# Patient Record
Sex: FEMALE | NOT HISPANIC OR LATINO | Employment: PART TIME | URBAN - METROPOLITAN AREA
[De-identification: names, ages, dates, MRNs, and addresses within clinical notes are randomized per-mention and may not be internally consistent; named-entity substitution may affect disease eponyms.]

---

## 2022-07-09 ENCOUNTER — HOSPITAL ENCOUNTER (EMERGENCY)
Facility: CLINIC | Age: 27
Discharge: HOME | End: 2022-07-09
Attending: EMERGENCY MEDICINE | Admitting: EMERGENCY MEDICINE

## 2022-07-09 ENCOUNTER — APPOINTMENT (OUTPATIENT)
Dept: ULTRASOUND IMAGING | Facility: CLINIC | Age: 27
End: 2022-07-09
Attending: EMERGENCY MEDICINE

## 2022-07-09 VITALS
DIASTOLIC BLOOD PRESSURE: 96 MMHG | OXYGEN SATURATION: 99 % | SYSTOLIC BLOOD PRESSURE: 144 MMHG | TEMPERATURE: 98.1 F | RESPIRATION RATE: 18 BRPM | HEART RATE: 100 BPM

## 2022-07-09 DIAGNOSIS — O20.0 THREATENED MISCARRIAGE IN EARLY PREGNANCY: ICD-10-CM

## 2022-07-09 LAB
ABO/RH(D): NORMAL
ALBUMIN UR-MCNC: NEGATIVE MG/DL
AMORPH CRY #/AREA URNS HPF: ABNORMAL /HPF
ANION GAP SERPL CALCULATED.3IONS-SCNC: 11 MMOL/L (ref 5–18)
ANTIBODY SCREEN: NEGATIVE
APPEARANCE UR: ABNORMAL
BACTERIA #/AREA URNS HPF: ABNORMAL /HPF
BASOPHILS # BLD AUTO: 0 10E3/UL (ref 0–0.2)
BASOPHILS NFR BLD AUTO: 0 %
BILIRUB UR QL STRIP: NEGATIVE
BUN SERPL-MCNC: 10 MG/DL (ref 8–22)
CALCIUM SERPL-MCNC: 9.7 MG/DL (ref 8.5–10.5)
CHLORIDE BLD-SCNC: 105 MMOL/L (ref 98–107)
CO2 SERPL-SCNC: 24 MMOL/L (ref 22–31)
COLOR UR AUTO: ABNORMAL
CREAT SERPL-MCNC: 0.63 MG/DL (ref 0.6–1.1)
EOSINOPHIL # BLD AUTO: 0.2 10E3/UL (ref 0–0.7)
EOSINOPHIL NFR BLD AUTO: 1 %
ERYTHROCYTE [DISTWIDTH] IN BLOOD BY AUTOMATED COUNT: 12.3 % (ref 10–15)
GFR SERPL CREATININE-BSD FRML MDRD: >90 ML/MIN/1.73M2
GLUCOSE BLD-MCNC: 87 MG/DL (ref 70–125)
GLUCOSE UR STRIP-MCNC: NEGATIVE MG/DL
HCG SERPL-ACNC: 109 MLU/ML (ref 0–4)
HCT VFR BLD AUTO: 43.5 % (ref 35–47)
HGB BLD-MCNC: 14.4 G/DL (ref 11.7–15.7)
HGB UR QL STRIP: ABNORMAL
HOLD SPECIMEN: NORMAL
IMM GRANULOCYTES # BLD: 0 10E3/UL
IMM GRANULOCYTES NFR BLD: 0 %
KETONES UR STRIP-MCNC: NEGATIVE MG/DL
LEUKOCYTE ESTERASE UR QL STRIP: ABNORMAL
LYMPHOCYTES # BLD AUTO: 3.9 10E3/UL (ref 0.8–5.3)
LYMPHOCYTES NFR BLD AUTO: 33 %
MCH RBC QN AUTO: 29.9 PG (ref 26.5–33)
MCHC RBC AUTO-ENTMCNC: 33.1 G/DL (ref 31.5–36.5)
MCV RBC AUTO: 90 FL (ref 78–100)
MONOCYTES # BLD AUTO: 0.9 10E3/UL (ref 0–1.3)
MONOCYTES NFR BLD AUTO: 8 %
MUCOUS THREADS #/AREA URNS LPF: PRESENT /LPF
NEUTROPHILS # BLD AUTO: 7 10E3/UL (ref 1.6–8.3)
NEUTROPHILS NFR BLD AUTO: 58 %
NITRATE UR QL: NEGATIVE
NRBC # BLD AUTO: 0 10E3/UL
NRBC BLD AUTO-RTO: 0 /100
PH UR STRIP: 7 [PH] (ref 5–7)
PLATELET # BLD AUTO: 294 10E3/UL (ref 150–450)
POTASSIUM BLD-SCNC: 4.2 MMOL/L (ref 3.5–5)
RBC # BLD AUTO: 4.81 10E6/UL (ref 3.8–5.2)
RBC URINE: 1 /HPF
SODIUM SERPL-SCNC: 140 MMOL/L (ref 136–145)
SP GR UR STRIP: 1.02 (ref 1–1.03)
SPECIMEN EXPIRATION DATE: NORMAL
SQUAMOUS EPITHELIAL: 6 /HPF
UROBILINOGEN UR STRIP-MCNC: <2 MG/DL
WBC # BLD AUTO: 12.1 10E3/UL (ref 4–11)
WBC URINE: 8 /HPF

## 2022-07-09 PROCEDURE — 36415 COLL VENOUS BLD VENIPUNCTURE: CPT | Performed by: EMERGENCY MEDICINE

## 2022-07-09 PROCEDURE — 85025 COMPLETE CBC W/AUTO DIFF WBC: CPT | Performed by: EMERGENCY MEDICINE

## 2022-07-09 PROCEDURE — 76801 OB US < 14 WKS SINGLE FETUS: CPT

## 2022-07-09 PROCEDURE — 81001 URINALYSIS AUTO W/SCOPE: CPT | Performed by: EMERGENCY MEDICINE

## 2022-07-09 PROCEDURE — 80048 BASIC METABOLIC PNL TOTAL CA: CPT | Performed by: EMERGENCY MEDICINE

## 2022-07-09 PROCEDURE — 87086 URINE CULTURE/COLONY COUNT: CPT | Performed by: EMERGENCY MEDICINE

## 2022-07-09 PROCEDURE — 99284 EMERGENCY DEPT VISIT MOD MDM: CPT | Mod: 25

## 2022-07-09 PROCEDURE — 86850 RBC ANTIBODY SCREEN: CPT | Performed by: EMERGENCY MEDICINE

## 2022-07-09 PROCEDURE — 84702 CHORIONIC GONADOTROPIN TEST: CPT | Performed by: EMERGENCY MEDICINE

## 2022-07-09 ASSESSMENT — ENCOUNTER SYMPTOMS
ABDOMINAL PAIN: 1
FEVER: 0

## 2022-07-09 NOTE — ED PROVIDER NOTES
EMERGENCY DEPARTMENT ENCOUNTER      NAME: Ana Luisa Diaz  AGE: 26 year old female  YOB: 1995  MRN: [id number]  EVALUATION DATE & TIME: 7/9/2022  6:34 PM    PCP: No primary care provider on file.    ED PROVIDER: José Miguel Rodriguez M.D.      Chief Complaint   Patient presents with     Vaginal Bleeding - Pregnant         FINAL IMPRESSION:  1. Threatened miscarriage in early pregnancy          ED COURSE & MEDICAL DECISION MAKING:    Pertinent Labs & Imaging studies reviewed. (See chart for details)  ED Course as of 07/09/22 2243   Sat Jul 09, 2022   1848 Patient is a 26-year-old who presents with vaginal spotting, some cramping yesterday but pain-free today.  She is 4-5 weeks pregnant, but has not established OB care yet.  She is mildly tachycardic, not bleeding significantly, but plan to rule out miscarriage with ultrasound, obtain initial dose of blood work here including Rh, UA   2029 US OB <14 Weeks W Transvaginal  1.  No intrauterine findings of pregnancy.     2.  Follow-up beta hCG and ultrasound may be useful if clinically indicated.     3.  Tiny follicles in both ovaries with minimal free fluid adjacent to the right ovary most typical for a recently ruptured cyst.   2154 WBC(!): 12.1   2221 hCG Quantitative(!): 109     After the patient using a Cambodian .  We discussed the differential that included very early pregnancy without visible gestational sac versus miscarriage.  She will follow-up with OB this coming week.  She was given the phone number to do this.  We also discussed importance of coming back to emergency department if she develops abdominal pain pelvic pain given possibility of ectopic.    Additional ED Course Timestamps:  6:50 PM I met with the patient, obtained history, performed an initial exam, and discussed options and plan for diagnostics and treatment here in the ED.  7:11 PM Reevaluated patient.       At the conclusion of the encounter I discussed the results of all of  the tests and the disposition. The questions were answered. The patient or family acknowledged understanding and was agreeable with the care plan.       MEDICATIONS GIVEN IN THE EMERGENCY:  Medications - No data to display      NEW PRESCRIPTIONS STARTED AT TODAY'S ER VISIT  New Prescriptions    No medications on file          =================================================================    HPI    Patient information was obtained from: Patient    Use of :  - Cambodian.         Ana Luisa Diaz is a 26 year old female with a noncontributory past medical history who presents to this ED for evaluation of vaginal bleeding.     Patient developed vaginal bleeding yesterday, 7/8. She was using the bathroom when she wiped and noticed blood on the tissue. Patient also endorsed localized lower abdominal cramping at this time. She states that she had some mild bleeding again today but the abdominal cramping has resolved. Currently, she denies in any aches or pains.     Patient's last menstrual period was in Mid April and she recently at a positive home pregnancy test. She approximates being 4-5 weeks pregnant. She has yet to see an OB provider. She currently takes prenatal vitamins. Patient denies vaginal discharge, fevers, or any other complaints at this time.          REVIEW OF SYSTEMS   Review of Systems   Constitutional: Negative for fever.   Gastrointestinal: Positive for abdominal pain (lower, cramping; since resolved).   Genitourinary: Positive for vaginal bleeding. Negative for vaginal discharge.   All other systems reviewed and are negative.     PAST MEDICAL HISTORY:  History reviewed. No pertinent past medical history.    PAST SURGICAL HISTORY:  History reviewed. No pertinent surgical history.        CURRENT MEDICATIONS:    No current facility-administered medications for this encounter.     No current outpatient medications on file.       ALLERGIES:  No Known Allergies    FAMILY HISTORY:  History  reviewed. No pertinent family history.    SOCIAL HISTORY:        VITALS:  BP (!) 144/96   Pulse 100   Temp 98.1  F (36.7  C) (Temporal)   Resp 18   SpO2 99%     PHYSICAL EXAM    Constitutional: Well developed, well nourished. Comfortable appearing.  HENT: Normocephalic, atraumatic, mucous membranes moist, nose normal. Neck- Supple, gross ROM intact.   Eyes: Pupils mid-range, conjunctiva without injection, no discharge.   Respiratory: Clear to auscultation bilaterally, no respiratory distress, no wheezing, speaks full sentences easily. No cough.  Cardiovascular: Regular rhythm, no murmurs. Tachycardic.   GI: Soft, no tenderness to deep palpation in all quadrants, no masses.   Musculoskeletal: Moving all 4 extremities intentionally and without pain. No obvious deformity.  Skin: Warm, dry, no rash.  Neurologic: Alert & oriented x 3, cranial nerves grossly intact.  Psychiatric: Affect normal, cooperative.       LAB:  All pertinent labs reviewed and interpreted.  Labs Ordered and Resulted from Time of ED Arrival to Time of ED Departure   HCG QUANTITATIVE PREGNANCY - Abnormal       Result Value    hCG Quantitative 109 (*)    CBC WITH PLATELETS AND DIFFERENTIAL - Abnormal    WBC Count 12.1 (*)     RBC Count 4.81      Hemoglobin 14.4      Hematocrit 43.5      MCV 90      MCH 29.9      MCHC 33.1      RDW 12.3      Platelet Count 294      % Neutrophils 58      % Lymphocytes 33      % Monocytes 8      % Eosinophils 1      % Basophils 0      % Immature Granulocytes 0      NRBCs per 100 WBC 0      Absolute Neutrophils 7.0      Absolute Lymphocytes 3.9      Absolute Monocytes 0.9      Absolute Eosinophils 0.2      Absolute Basophils 0.0      Absolute Immature Granulocytes 0.0      Absolute NRBCs 0.0     BASIC METABOLIC PANEL - Normal    Sodium 140      Potassium 4.2      Chloride 105      Carbon Dioxide (CO2) 24      Anion Gap 11      Urea Nitrogen 10      Creatinine 0.63      Calcium 9.7      Glucose 87      GFR Estimate >90      ROUTINE UA WITH MICROSCOPIC REFLEX TO CULTURE   TYPE AND SCREEN, ADULT   ABO/RH TYPE AND SCREEN       RADIOLOGY:  Reviewed all pertinent imaging. Please see official radiology report.  US OB <14 Weeks W Transvaginal   Final Result   IMPRESSION:    1.  No intrauterine findings of pregnancy.      2.  Follow-up beta hCG and ultrasound may be useful if clinically indicated.      3.  Tiny follicles in both ovaries with minimal free fluid adjacent to the right ovary most typical for a recently ruptured cyst.                EKG:    All EKG interpretations will be found in ED course above.    PROCEDURES:   None.       I, Renetta Smith am serving as a scribe to document services personally performed by Dr. José Miguel Rodriguez based on my observation and the provider's statements to me. I, José Miguel Rodriguez MD attest that Renetta Smith is acting in a scribe capacity, has observed my performance of the services and has documented them in accordance with my direction.    José Miguel Rodriguez M.D.  Emergency Medicine  [facility] EMERGENCY ROOM  [address] [facility] [id number] [phone]  Dept: [phone]     José Miguel Rodriguez MD  07/09/22 8833

## 2022-07-09 NOTE — ED TRIAGE NOTES
Pt reports she is 5 weeks pregnant. Reports last night she began to have lower abdominal pain and noted she was bleeding with urination. Denies pain at present.  services used for triage.     Triage Assessment     Row Name 07/09/22 5090       Triage Assessment (Adult)    Airway WDL WDL       Respiratory WDL    Respiratory WDL WDL       Skin Circulation/Temperature WDL    Skin Circulation/Temperature WDL WDL       Cardiac WDL    Cardiac WDL WDL       Peripheral/Neurovascular WDL    Peripheral Neurovascular WDL WDL       Cognitive/Neuro/Behavioral WDL    Cognitive/Neuro/Behavioral WDL WDL

## 2022-07-10 NOTE — ED NOTES
RN attempt x2 for IV. Per MD, okay for labs draw only. Ultrasound technician at bedside. MD stated it's okay for patient to receive ultrasound first before lab draws.

## 2022-07-10 NOTE — DISCHARGE INSTRUCTIONS
Your pregnancy test here was positive.  The ultrasound did not show any fetus, however this may be because it is so early on in the pregnancy that we cannot see anything yet. So your are pregnant, but we need to confirm that you are not having a miscarriage. The only way to confirm this is to see an OB doctor this week and repeat the lab test (the HCG).    Please call the clinic on Monday to set up an appointment with OB/GYN.  If you are having any abdominal pain, cramping, pelvic pain, please come back for reevaluation.

## 2022-07-11 LAB — BACTERIA UR CULT: NORMAL

## 2022-07-15 ENCOUNTER — LAB (OUTPATIENT)
Dept: LAB | Facility: CLINIC | Age: 27
End: 2022-07-15

## 2022-07-15 ENCOUNTER — OFFICE VISIT (OUTPATIENT)
Dept: MIDWIFE SERVICES | Facility: CLINIC | Age: 27
End: 2022-07-15

## 2022-07-15 VITALS
BODY MASS INDEX: 20.39 KG/M2 | HEART RATE: 80 BPM | WEIGHT: 110.8 LBS | HEIGHT: 62 IN | DIASTOLIC BLOOD PRESSURE: 70 MMHG | SYSTOLIC BLOOD PRESSURE: 94 MMHG

## 2022-07-15 DIAGNOSIS — O20.0 THREATENED MISCARRIAGE IN EARLY PREGNANCY: Primary | ICD-10-CM

## 2022-07-15 LAB — HCG SERPL-ACNC: 12 MLU/ML (ref 0–4)

## 2022-07-15 PROCEDURE — 99205 OFFICE O/P NEW HI 60 MIN: CPT | Performed by: ADVANCED PRACTICE MIDWIFE

## 2022-07-15 PROCEDURE — 36415 COLL VENOUS BLD VENIPUNCTURE: CPT

## 2022-07-15 PROCEDURE — 84702 CHORIONIC GONADOTROPIN TEST: CPT

## 2022-07-15 NOTE — PROGRESS NOTES
Problem visit for SAB/MAB  A:   26 year old   Patient's last menstrual period was 2022. 5w6d  Threatened Miscarriage in early pregnancy       P:   -Discussed that symptoms, including bleeding and cramping as well as US findings from ED visit are suspect of an early pregnancy loss; given emotional support.  -Reviewed risk of miscarriage with each pregnancy at patient's age. No cause is identified in most cases, but the single most common cause of a miscarriage is aneuploidy, which occurs randomly. -Discussed that it is possible that she is nearing or has already completed the process and that continued expectant management is an appropriate option. Discussed parameters for expected bleeding. Gave CNM on-call number and discussed when to call for warning signs such as heavy vaginal bleeding and signs of infection.   [place] [facility] [place] to discuss results with the use of an interpretor  Discussed that if hcg is at non pregnant level and bleeding has ceased, there will likely be no further follow up. If hcg is still high or increasing, CNM will make plan for follow up as appropriate  -Discussed attempts at conceiving after SAB; advised okay to attempt immediately after negative pregnancy test if desires or wait until first menses to time fertility. Discussed emotional implications as well as physical and offered support.  -Discussed role of CNM during this recovery phase and offered support by follow up visit or by on-call CNM number if has questions or would benefit from support.   -Encouraged ongoing emotional support   -RTC to CNM PRN     S: Ana Luisa presents with spouse Kaylen today with bleeding and cramping in early pregnancy.   Cambodian telephone interpretor was used for this visit.    Reports LMP of 2022 and a positive pregnancy test on 22. Ana Luisa reports mild increase in appetite and no other pregnancy symptoms. On 2022 she notes vaginal bleeding accompanied by  "menstrual-like cramping. Menstrual-like bleeding continued for 3 days, with one large clot noted on 7/10. Ana Luisa brings photo of the clot today. This writer observed clot to be approximately 2cm x1cm and dark red in color. Ana Luisa reports that since that clot, bleeding began to taper and is now nearly resolved with some pink tinge to vaginal discharge noted when wiping.    Patient had ED visit on 7/9/2022 with US revealing no intrauterine pregnancy at that time. Hcg quant at ED visit was 109 and plan was for f/u with OB provider and possible follow up hcg.     Ana Luisa did take a follow up pregnancy test this week and it was still positive. Ana Luisa brought test with her today and this writer observed positive test.    Discussed likelihood of early pregnancy loss. Ana Luisa is understands the circumstances and has question if management for miscarriage requires any medication or further if follow would be needed.  Also has questions about return to fertility and when she can being trying to conceive again a well as if and when safe to resume intercourse.     O:   BP 94/70   Pulse 80   Ht 1.562 m (5' 1.5\")   Wt 50.3 kg (110 lb 12.8 oz)   LMP 06/04/2022   Breastfeeding No   BMI 20.60 kg/m         Time spent:  Chart review/Pre-charting on 07/15/22: 10 minutes  Face-to-face visit: 45 minutes with >50% on education, counseling and coordination of care.   Documentation:  15 minutes  Total time spent on day of service:  70  minutes     Patient was seen with student, SARA Jason who was present for learning. I personally assessed, examined and made clinical decisions reflected in the documentation.      Florina Mar, DNP, APRN, CNM            "